# Patient Record
(demographics unavailable — no encounter records)

---

## 2018-01-12 NOTE — CT
CT OF CHEST WITHOUT CONTRAT:

 

Date:  01/12/18

 

COMPARISON:  

None. 

 

HISTORY:  

Personal history of nicotine dependence. Patient had a 30+ year smoking history. 

 

TECHNIQUE:  

Multiple contiguous axial images were obtained in a CT of the chest without contrast per low dose [facility] [place] cancer screening protocol. Sagittal and coronal reformats were performed. 

 

FINDINGS:

No pulmonary nodules are identified. No pneumothorax or pleural effusion seen. No focal infiltrates s
een in the lungs.

 

The heart is normal in size. Calcifications are seen in the coronary arteries and aorta. No hilar or 
mediastinal lymphadenopathy are appreciated on this limited noncontrast examination. 

 

The visualized subdiaphragmatic structures are unremarkable. The patient is status post cholecystecto
my. There is scoliotic curvature and mild degenerative changes in the spine. The chest wall soft tiss
ues are unremarkable. 

 

IMPRESSION: 

Lung-RADS category 1 - negative. 

 

 

POS: [id number]